# Patient Record
Sex: MALE | Race: ASIAN | NOT HISPANIC OR LATINO | URBAN - METROPOLITAN AREA
[De-identification: names, ages, dates, MRNs, and addresses within clinical notes are randomized per-mention and may not be internally consistent; named-entity substitution may affect disease eponyms.]

---

## 2018-12-05 ENCOUNTER — EMERGENCY (EMERGENCY)
Facility: HOSPITAL | Age: 63
LOS: 1 days | Discharge: HOME | End: 2018-12-05
Attending: EMERGENCY MEDICINE
Payer: COMMERCIAL

## 2018-12-05 VITALS
WEIGHT: 171.08 LBS | HEART RATE: 63 BPM | DIASTOLIC BLOOD PRESSURE: 73 MMHG | TEMPERATURE: 98 F | SYSTOLIC BLOOD PRESSURE: 146 MMHG | OXYGEN SATURATION: 97 % | RESPIRATION RATE: 16 BRPM | HEIGHT: 66 IN

## 2018-12-05 LAB
ALBUMIN SERPL ELPH-MCNC: 4.6 G/DL — SIGNIFICANT CHANGE UP (ref 3.3–5)
ALP SERPL-CCNC: 50 U/L — SIGNIFICANT CHANGE UP (ref 40–120)
ALT FLD-CCNC: 48 U/L — HIGH (ref 10–45)
ANION GAP SERPL CALC-SCNC: 14 MMOL/L — SIGNIFICANT CHANGE UP (ref 5–17)
APPEARANCE UR: ABNORMAL
AST SERPL-CCNC: 30 U/L — SIGNIFICANT CHANGE UP (ref 10–40)
BACTERIA # UR AUTO: ABNORMAL
BASOPHILS # BLD AUTO: 0.2 K/UL — SIGNIFICANT CHANGE UP (ref 0–0.2)
BASOPHILS NFR BLD AUTO: 2.3 % — HIGH (ref 0–2)
BILIRUB SERPL-MCNC: 0.4 MG/DL — SIGNIFICANT CHANGE UP (ref 0.2–1.2)
BILIRUB UR-MCNC: NEGATIVE — SIGNIFICANT CHANGE UP
BUN SERPL-MCNC: 13 MG/DL — SIGNIFICANT CHANGE UP (ref 7–23)
CALCIUM SERPL-MCNC: 9.9 MG/DL — SIGNIFICANT CHANGE UP (ref 8.4–10.5)
CHLORIDE SERPL-SCNC: 102 MMOL/L — SIGNIFICANT CHANGE UP (ref 96–108)
CO2 SERPL-SCNC: 23 MMOL/L — SIGNIFICANT CHANGE UP (ref 22–31)
COLOR SPEC: SIGNIFICANT CHANGE UP
CREAT SERPL-MCNC: 1.16 MG/DL — SIGNIFICANT CHANGE UP (ref 0.5–1.3)
DIFF PNL FLD: ABNORMAL
EOSINOPHIL # BLD AUTO: 0.2 K/UL — SIGNIFICANT CHANGE UP (ref 0–0.5)
EOSINOPHIL NFR BLD AUTO: 2.2 % — SIGNIFICANT CHANGE UP (ref 0–6)
EPI CELLS # UR: 0 /HPF — SIGNIFICANT CHANGE UP
GLUCOSE SERPL-MCNC: 126 MG/DL — HIGH (ref 70–99)
GLUCOSE UR QL: ABNORMAL
HCT VFR BLD CALC: 37.6 % — LOW (ref 39–50)
HGB BLD-MCNC: 12.7 G/DL — LOW (ref 13–17)
HYALINE CASTS # UR AUTO: 0 /LPF — SIGNIFICANT CHANGE UP (ref 0–2)
KETONES UR-MCNC: NEGATIVE — SIGNIFICANT CHANGE UP
LEUKOCYTE ESTERASE UR-ACNC: ABNORMAL
LIDOCAIN IGE QN: 333 U/L — HIGH (ref 7–60)
LYMPHOCYTES # BLD AUTO: 1.7 K/UL — SIGNIFICANT CHANGE UP (ref 1–3.3)
LYMPHOCYTES # BLD AUTO: 17.1 % — SIGNIFICANT CHANGE UP (ref 13–44)
MCHC RBC-ENTMCNC: 29.1 PG — SIGNIFICANT CHANGE UP (ref 27–34)
MCHC RBC-ENTMCNC: 33.9 GM/DL — SIGNIFICANT CHANGE UP (ref 32–36)
MCV RBC AUTO: 85.9 FL — SIGNIFICANT CHANGE UP (ref 80–100)
MONOCYTES # BLD AUTO: 0.7 K/UL — SIGNIFICANT CHANGE UP (ref 0–0.9)
MONOCYTES NFR BLD AUTO: 7 % — SIGNIFICANT CHANGE UP (ref 2–14)
NEUTROPHILS # BLD AUTO: 7.1 K/UL — SIGNIFICANT CHANGE UP (ref 1.8–7.4)
NEUTROPHILS NFR BLD AUTO: 71.5 % — SIGNIFICANT CHANGE UP (ref 43–77)
NITRITE UR-MCNC: NEGATIVE — SIGNIFICANT CHANGE UP
PH UR: 6 — SIGNIFICANT CHANGE UP (ref 5–8)
PLATELET # BLD AUTO: 217 K/UL — SIGNIFICANT CHANGE UP (ref 150–400)
POTASSIUM SERPL-MCNC: 5 MMOL/L — SIGNIFICANT CHANGE UP (ref 3.5–5.3)
POTASSIUM SERPL-SCNC: 5 MMOL/L — SIGNIFICANT CHANGE UP (ref 3.5–5.3)
PROT SERPL-MCNC: 7.5 G/DL — SIGNIFICANT CHANGE UP (ref 6–8.3)
PROT UR-MCNC: ABNORMAL
RBC # BLD: 4.37 M/UL — SIGNIFICANT CHANGE UP (ref 4.2–5.8)
RBC # FLD: 12.7 % — SIGNIFICANT CHANGE UP (ref 10.3–14.5)
RBC CASTS # UR COMP ASSIST: 13 /HPF — HIGH (ref 0–4)
SODIUM SERPL-SCNC: 139 MMOL/L — SIGNIFICANT CHANGE UP (ref 135–145)
SP GR SPEC: 1.01 — SIGNIFICANT CHANGE UP (ref 1.01–1.02)
UROBILINOGEN FLD QL: NEGATIVE — SIGNIFICANT CHANGE UP
WBC # BLD: 10 K/UL — SIGNIFICANT CHANGE UP (ref 3.8–10.5)
WBC # FLD AUTO: 10 K/UL — SIGNIFICANT CHANGE UP (ref 3.8–10.5)
WBC UR QL: 206 /HPF — HIGH (ref 0–5)

## 2018-12-05 PROCEDURE — 74176 CT ABD & PELVIS W/O CONTRAST: CPT | Mod: 26

## 2018-12-05 PROCEDURE — 99218: CPT

## 2018-12-05 RX ORDER — DEXTROSE 50 % IN WATER 50 %
12.5 SYRINGE (ML) INTRAVENOUS ONCE
Qty: 0 | Refills: 0 | Status: DISCONTINUED | OUTPATIENT
Start: 2018-12-05 | End: 2018-12-09

## 2018-12-05 RX ORDER — SODIUM CHLORIDE 9 MG/ML
1000 INJECTION, SOLUTION INTRAVENOUS ONCE
Qty: 0 | Refills: 0 | Status: COMPLETED | OUTPATIENT
Start: 2018-12-05 | End: 2018-12-05

## 2018-12-05 RX ORDER — INSULIN LISPRO 100/ML
VIAL (ML) SUBCUTANEOUS AT BEDTIME
Qty: 0 | Refills: 0 | Status: DISCONTINUED | OUTPATIENT
Start: 2018-12-05 | End: 2018-12-09

## 2018-12-05 RX ORDER — SODIUM CHLORIDE 9 MG/ML
1000 INJECTION, SOLUTION INTRAVENOUS
Qty: 0 | Refills: 0 | Status: DISCONTINUED | OUTPATIENT
Start: 2018-12-05 | End: 2018-12-09

## 2018-12-05 RX ORDER — ATORVASTATIN CALCIUM 80 MG/1
80 TABLET, FILM COATED ORAL AT BEDTIME
Qty: 0 | Refills: 0 | Status: DISCONTINUED | OUTPATIENT
Start: 2018-12-05 | End: 2018-12-09

## 2018-12-05 RX ORDER — IBUPROFEN 200 MG
600 TABLET ORAL ONCE
Qty: 0 | Refills: 0 | Status: DISCONTINUED | OUTPATIENT
Start: 2018-12-05 | End: 2018-12-05

## 2018-12-05 RX ORDER — DEXTROSE 50 % IN WATER 50 %
25 SYRINGE (ML) INTRAVENOUS ONCE
Qty: 0 | Refills: 0 | Status: DISCONTINUED | OUTPATIENT
Start: 2018-12-05 | End: 2018-12-09

## 2018-12-05 RX ORDER — CEFTRIAXONE 500 MG/1
1 INJECTION, POWDER, FOR SOLUTION INTRAMUSCULAR; INTRAVENOUS EVERY 12 HOURS
Qty: 0 | Refills: 0 | Status: DISCONTINUED | OUTPATIENT
Start: 2018-12-05 | End: 2018-12-09

## 2018-12-05 RX ORDER — CEFTRIAXONE 500 MG/1
1 INJECTION, POWDER, FOR SOLUTION INTRAMUSCULAR; INTRAVENOUS ONCE
Qty: 0 | Refills: 0 | Status: COMPLETED | OUTPATIENT
Start: 2018-12-05 | End: 2018-12-05

## 2018-12-05 RX ORDER — ISOSORBIDE MONONITRATE 60 MG/1
60 TABLET, EXTENDED RELEASE ORAL DAILY
Qty: 0 | Refills: 0 | Status: DISCONTINUED | OUTPATIENT
Start: 2018-12-05 | End: 2018-12-09

## 2018-12-05 RX ORDER — LISINOPRIL 2.5 MG/1
20 TABLET ORAL DAILY
Qty: 0 | Refills: 0 | Status: DISCONTINUED | OUTPATIENT
Start: 2018-12-05 | End: 2018-12-09

## 2018-12-05 RX ORDER — CARVEDILOL PHOSPHATE 80 MG/1
6.25 CAPSULE, EXTENDED RELEASE ORAL EVERY 12 HOURS
Qty: 0 | Refills: 0 | Status: DISCONTINUED | OUTPATIENT
Start: 2018-12-05 | End: 2018-12-09

## 2018-12-05 RX ORDER — DEXTROSE 50 % IN WATER 50 %
15 SYRINGE (ML) INTRAVENOUS ONCE
Qty: 0 | Refills: 0 | Status: DISCONTINUED | OUTPATIENT
Start: 2018-12-05 | End: 2018-12-09

## 2018-12-05 RX ORDER — KETOROLAC TROMETHAMINE 30 MG/ML
15 SYRINGE (ML) INJECTION ONCE
Qty: 0 | Refills: 0 | Status: DISCONTINUED | OUTPATIENT
Start: 2018-12-05 | End: 2018-12-05

## 2018-12-05 RX ORDER — ACETAMINOPHEN 500 MG
975 TABLET ORAL ONCE
Qty: 0 | Refills: 0 | Status: COMPLETED | OUTPATIENT
Start: 2018-12-05 | End: 2018-12-05

## 2018-12-05 RX ORDER — GLUCAGON INJECTION, SOLUTION 0.5 MG/.1ML
1 INJECTION, SOLUTION SUBCUTANEOUS ONCE
Qty: 0 | Refills: 0 | Status: DISCONTINUED | OUTPATIENT
Start: 2018-12-05 | End: 2018-12-09

## 2018-12-05 RX ORDER — INSULIN LISPRO 100/ML
VIAL (ML) SUBCUTANEOUS
Qty: 0 | Refills: 0 | Status: DISCONTINUED | OUTPATIENT
Start: 2018-12-05 | End: 2018-12-09

## 2018-12-05 RX ADMIN — Medication 15 MILLIGRAM(S): at 16:44

## 2018-12-05 RX ADMIN — ATORVASTATIN CALCIUM 80 MILLIGRAM(S): 80 TABLET, FILM COATED ORAL at 22:17

## 2018-12-05 RX ADMIN — CEFTRIAXONE 100 GRAM(S): 500 INJECTION, POWDER, FOR SOLUTION INTRAMUSCULAR; INTRAVENOUS at 14:10

## 2018-12-05 RX ADMIN — SODIUM CHLORIDE 150 MILLILITER(S): 9 INJECTION, SOLUTION INTRAVENOUS at 16:44

## 2018-12-05 RX ADMIN — CARVEDILOL PHOSPHATE 6.25 MILLIGRAM(S): 80 CAPSULE, EXTENDED RELEASE ORAL at 22:18

## 2018-12-05 RX ADMIN — Medication 975 MILLIGRAM(S): at 12:19

## 2018-12-05 RX ADMIN — SODIUM CHLORIDE 1000 MILLILITER(S): 9 INJECTION, SOLUTION INTRAVENOUS at 12:18

## 2018-12-05 RX ADMIN — LISINOPRIL 20 MILLIGRAM(S): 2.5 TABLET ORAL at 22:18

## 2018-12-05 NOTE — ED PROVIDER NOTE - PROGRESS NOTE DETAILS
discussion with patient after CT scan results are negative for stone. UA markedly positive, patient has pyelo in the setting of diabetes. Patient more comfortable and afebrile. will send to CDU for observation of fever and symptomatic development, blood cultures sent and ceftriaxone ordered. patient verbalizes agreement with this plan, as he does not want to be admitted at this time. -Padma Thompson PA-C

## 2018-12-05 NOTE — ED ADULT NURSE NOTE - NSIMPLEMENTINTERV_GEN_ALL_ED
Implemented All Universal Safety Interventions:  [place] to call system. Call bell, personal items and telephone within reach. Instruct patient to call for assistance. Room bathroom lighting operational. Non-slip footwear when patient is off stretcher. Physically safe environment: no spills, clutter or unnecessary equipment. Stretcher in lowest position, wheels locked, appropriate side rails in place.

## 2018-12-05 NOTE — ED PROVIDER NOTE - NS ED ROS FT
Constitutional: +subjective fever and chills  Eyes: No visual changes, eye pain or redness  HEENT: No throat pain, ear pain, nasal pain. No nose bleeding.  CV: No chest pain or lower extremity edema  Resp: No SOB no cough  GI: +abdominal pain. No nausea or vomiting. No diarrhea. No constipation.   : +dysuria and hematuria last night, resolved.   MSK: No musculoskeletal pain  Skin: No rash  Neuro: No headache. No numbness or tingling. No weakness.

## 2018-12-05 NOTE — ED CDU PROVIDER DISPOSITION NOTE - CLINICAL COURSE
64 y/o M PMHx insulin dependent  DM2, HTN, HLD, glaucoma presenting with episode of hematuria and mild abdominal pain x 2 days. Patient reports that pain in his abdomen started yesterday, below his umbilicus and in his R side of his back. He states pain comes and goes in severity but is there all the time. He states he got up to urinate last night and had blood in urine and 'thick urine' which he never had before. He states he took a significant amount of water after this happened and urine has been clear since that time. Reported some pain with urination on episode with blood but has not had pain since that time. Denies any history of kidney stones. No frequency, urgency. Reports he felt feverish and chills last night but did not check his temperature.  ED course: Labs unremarkable except lipase 233 and UA + WBC, leuks, blood, CT AP non contrast negative for acute pathology. Given IVF and IV ceftriaxone while in ED. Patient sent to CDU to monitor for fever, IVF, IV abx, and pain control. 64 y/o M PMHx insulin dependent  DM2, HTN, HLD, glaucoma presenting with episode of hematuria and mild abdominal pain x 2 days. Patient reports that pain in his abdomen started yesterday, below his umbilicus and in his R side of his back. He states pain comes and goes in severity but is there all the time. He states he got up to urinate last night and had blood in urine and 'thick urine' which he never had before. He states he took a significant amount of water after this happened and urine has been clear since that time. Reported some pain with urination on episode with blood but has not had pain since that time. Denies any history of kidney stones. No frequency, urgency. Reports he felt feverish and chills last night but did not check his temperature.  ED course: Labs unremarkable except lipase 233 and UA + WBC, leuks, blood, CT AP non contrast negative for acute pathology. Given IVF and IV ceftriaxone while in ED. Patient sent to CDU to monitor for fever, IVF, IV abx, and pain control.  feeling in CDU with improving Lipase level. Afebrile. Will follow up with PMD and strict return precautions advised.

## 2018-12-05 NOTE — ED CDU PROVIDER INITIAL DAY NOTE - PROGRESS NOTE DETAILS
BAKARI Moy: Patient complaining of 7/10 left third toe pain since yesterday with minimal improvement of home motrin 600mg in which he was taking q4 hours. Examined toe. Does not appear to infected, no evidence of trauma, or gout. No erythema and no draining wound. TTP. Will give toradol and reassess BAKARI Moy: Patient seen at bedside in NAD.  VSS.  Patient resting comfortably without complaints. CDU NOTE BAKARI DOE: Pt resting comfortably, feeling well without complaint. denies any abd/flank pain. notes having some pain in his L 4th toe that has been occurring intermittently for several days, denies any injury. states he got toradol here for the pain but it didn't really help. pt states pain is 7/10 and feels like pins. pt never diagnosed with peripheral neuropathy in the past. NAD, VSS. Abd: flat/ND/NT/soft, no CVAT b/l, MS: L foot: FROM ankle and toes, no swelling, no sign of injury, cap refill <2 sec. pt edu on neuropathy, should f/u with PMD outpt to r/o neuropathy considering h/o DM. will continue monitoring.

## 2018-12-05 NOTE — ED CDU PROVIDER DISPOSITION NOTE - ATTENDING CONTRIBUTION TO CARE
Pt with abdominal pain and R flank pain, ct negative, observed with IV antibiotics and serial exams improved now stable for dc home.

## 2018-12-05 NOTE — ED CDU PROVIDER DISPOSITION NOTE - PLAN OF CARE
1. Take ______ as prescribed.  Increase fluids. Take Motrin 600mg every 8 hours with food if needed for pain.   2.  Follow up with your PMD within 48-72 hours.  3. Worsening pain, new fever, chills, nausea, vomiting return to ER 1. Take Cefpodoxime as prescribed.  Increase fluids. Take Tylenol 650mg every 4-6 hours with food if needed for pain. Continue with all your home medications.   2.  Follow up with your PMD within 48-72 hours.  3. Worsening pain, new fever, chills, nausea, vomiting return to ER

## 2018-12-05 NOTE — ED PROVIDER NOTE - PHYSICAL EXAMINATION
GEN: Well Appearing, Nontoxic, NAD  HEENT: NC/AT, Symm Facies. PERRL, EOMI, MMM, posterior pharynx clear  CV: No JVD/Bruits or stridor;  +S1S2, RRR w/o m/g/r  RESP: CTAB w/o w/r/r  ABD: Soft, mild abdominal tenderness suprapubic and mild distention, +BS. No guarding/rebound. No RUQ tender, no CVAT  EXT/MSK: No lower extremity edema or calf tenderness. WWP, palpable pulses. FROMx4  SKIN: No erythema, lesions or rash  Neuro: Grossly intact, AOX3 with normal speech, CN II-XII intact; Sensation intact, motor 5/5 throughout. Gait normal

## 2018-12-05 NOTE — ED CDU PROVIDER INITIAL DAY NOTE - OBJECTIVE STATEMENT
62 y/o M PMHx DM2, HTN, HLD, glaucoma presenting with episode of blood in urine, 'thinking urine was thick' last night, as well as some mild abdominal pain. Patient reports that pain in his abdomen started yesterday, below his umbilicus and in his R side of his back. He states pain comes and goes in severity but is there all the time. He states he got up to urinate last night and had blood in urine and 'thick urine' which he never had before. He states he took a significant amount of water after this happened and urine has been clear since that time. Reported some pain with urination on episode with blood but has not had pain since that time. Denies any history of kidney stones. No frequency, urgency. Reports he felt feverish and chills last night but did not check his temperature.    ED course: Labs unremarkable except lipase 233 and UA + WBC, leuks, blood, CT AP non contrast negative for acute pathology. Given IVF and IV ceftriaxone while in ED. Patient was transferred to monitor for fever, IVF, IV abx, and pain control 64 y/o M PMHx insulin dependent  DM2, HTN, HLD, glaucoma presenting with episode of blood in urine, 'thinking urine was thick' last night, as well as some mild abdominal pain. Patient reports that pain in his abdomen started yesterday, below his umbilicus and in his R side of his back. He states pain comes and goes in severity but is there all the time. He states he got up to urinate last night and had blood in urine and 'thick urine' which he never had before. He states he took a significant amount of water after this happened and urine has been clear since that time. Reported some pain with urination on episode with blood but has not had pain since that time. Denies any history of kidney stones. No frequency, urgency. Reports he felt feverish and chills last night but did not check his temperature.    ED course: Labs unremarkable except lipase 233 and UA + WBC, leuks, blood, CT AP non contrast negative for acute pathology. Given IVF and IV ceftriaxone while in ED. Patient was transferred to monitor for fever, IVF, IV abx, and pain control

## 2018-12-05 NOTE — ED ADULT NURSE NOTE - PMH
Diabetes    Diabetes    Diabetes mellitus  x 20 years, denies N/N/R  Glaucoma    HTN (hypertension)    HTN (Hypertension)    Hypercholesterolemia    Hyperlipidemia    Hyperlipidemia    Hypertension    Retinal Hemorrhage  s/p bilateral laser surgery  S/P cardiac catheterization  ~2007 in [facility] as per pt

## 2018-12-05 NOTE — ED CDU PROVIDER INITIAL DAY NOTE - DETAILS
62 y/o M PMHx DM2, presenting with episode of blood in urine, 'thinking urine was thick' last night, as well as some mild abdominal pain.   *PYELONEPHRITIS  -IV ceftriaxone 1g q12 hours, IVF, and pain control  -continuous monitoring and freq re-evals  -Discussed case with Dr. Toby Caro

## 2018-12-05 NOTE — ED ADULT NURSE NOTE - OBJECTIVE STATEMENT
62 y/o male with pmhx hernia and [place] accompanied by spouse c/o RLQ pain that radiates throughout R flank and across the abdomen diffusely associated with fever, chills and bright red hematuria with clots x last night.  pt denies any n/v/d or constipation at this time.  abdomen is distended and tender to palpation.  pt is awake, alert and resoponsive to all stimuli.  no sob or respiratory distress noted.  pt was w/u and resting in bed comfortably.  will continue to monitor.

## 2018-12-05 NOTE — ED PROVIDER NOTE - MEDICAL DECISION MAKING DETAILS
Attending MD Caro: 63M with hematuria and lower abd/flank pain, suspect ureteral colic. Plan for CT a/p to confirm kidney stones, UA, analgesia

## 2018-12-05 NOTE — ED PROVIDER NOTE - ATTENDING CONTRIBUTION TO CARE
Attending MD Caro:   I personally have seen and examined this patient.  Physician assistant note reviewed and agree on plan of care and except where noted.  See HPI, PE, and MDM for details.

## 2018-12-05 NOTE — ED PROVIDER NOTE - OBJECTIVE STATEMENT
62 y/o M pmhx DM2, presenting with episode of blood in urine, 'thinking urine was thick' last night, as well as some mild abdominal pain. Patient reports that pain in his abdomen started yesterday, below his umbilicus and in his R side of his back. He states pain comes and goes in severity but is there all the time. He states he got up to urinate last night and had blood in urine and 'thick urine' which he never had before. He states he took a significant amount of water after this happened and urine has been clear since that time. Reported some pain with urination on episode with blood but has not had pain since that time. Denies any history of kidney stones. No frequency, urgency. Reports he felt feverish and chills last night but did not check his temperature.

## 2018-12-05 NOTE — ED ADULT NURSE REASSESSMENT NOTE - NS ED NURSE REASSESS COMMENT FT1
15.15 Pt received from LACEY Reis . Pt is monitored for pyelonephritis for fluids & antibiotics .Pt is A&O x 4. Pt denies any chest pain, palpitations, or dizziness as of now. C/O burning micturition & pt C/O Left toe pain  Safety & comfort measures maintained. IV site looks clean & dry No signs of infiltration noted Call bell in reach. Has stable vitals Will continue to monitor. Family at bed side Continue to monitor
Pt received from LACEY Gomez. Pt oriented to CDU & plan of care was discussed. Pt A&O x 4. Pt in CDU for IV abx and IV fluids. Pt c/o pressures in his abdomen after her urinates, and frequency. Pt denies any abdominal pain right now. Family at bedside. Safety & comfort measures maintained. Call bell in reach. Will continue to monitor.

## 2018-12-06 VITALS
OXYGEN SATURATION: 96 % | SYSTOLIC BLOOD PRESSURE: 164 MMHG | TEMPERATURE: 98 F | HEART RATE: 75 BPM | DIASTOLIC BLOOD PRESSURE: 85 MMHG | RESPIRATION RATE: 18 BRPM

## 2018-12-06 LAB — LIDOCAIN IGE QN: 52 U/L — SIGNIFICANT CHANGE UP (ref 7–60)

## 2018-12-06 PROCEDURE — 87186 SC STD MICRODIL/AGAR DIL: CPT

## 2018-12-06 PROCEDURE — 83690 ASSAY OF LIPASE: CPT

## 2018-12-06 PROCEDURE — 99284 EMERGENCY DEPT VISIT MOD MDM: CPT | Mod: 25

## 2018-12-06 PROCEDURE — 85027 COMPLETE CBC AUTOMATED: CPT

## 2018-12-06 PROCEDURE — 96376 TX/PRO/DX INJ SAME DRUG ADON: CPT

## 2018-12-06 PROCEDURE — 81001 URINALYSIS AUTO W/SCOPE: CPT

## 2018-12-06 PROCEDURE — 80053 COMPREHEN METABOLIC PANEL: CPT

## 2018-12-06 PROCEDURE — 96374 THER/PROPH/DIAG INJ IV PUSH: CPT

## 2018-12-06 PROCEDURE — 99217: CPT

## 2018-12-06 PROCEDURE — 96375 TX/PRO/DX INJ NEW DRUG ADDON: CPT

## 2018-12-06 PROCEDURE — 87086 URINE CULTURE/COLONY COUNT: CPT

## 2018-12-06 PROCEDURE — 74176 CT ABD & PELVIS W/O CONTRAST: CPT

## 2018-12-06 PROCEDURE — 87040 BLOOD CULTURE FOR BACTERIA: CPT

## 2018-12-06 PROCEDURE — 82962 GLUCOSE BLOOD TEST: CPT

## 2018-12-06 PROCEDURE — G0378: CPT

## 2018-12-06 RX ORDER — CEFPODOXIME PROXETIL 100 MG
1 TABLET ORAL
Qty: 20 | Refills: 0 | OUTPATIENT
Start: 2018-12-06 | End: 2018-12-15

## 2018-12-06 RX ADMIN — CEFTRIAXONE 100 GRAM(S): 500 INJECTION, POWDER, FOR SOLUTION INTRAMUSCULAR; INTRAVENOUS at 01:52

## 2018-12-06 NOTE — ED CDU PROVIDER SUBSEQUENT DAY NOTE - HISTORY
No interval changes since initial CDU provider note. Pt feels well without complaint. denies abd/flank pain. NAD, VSS. Abd: Flat/ND/NT/soft. will continue IV ceftriaxone and monitoring. Lauren Alfaro

## 2018-12-06 NOTE — ED CDU PROVIDER SUBSEQUENT DAY NOTE - ATTENDING CONTRIBUTION TO CARE
Pt with abdominal pain and R flank pain with some dysuria reported.  Abdominal CT negative.  Pt in observation with IV antibiotics and serial exams.  Pain resolved in observation and tolerating PO.  Pt good outpatient candidate.

## 2018-12-06 NOTE — ED CDU PROVIDER SUBSEQUENT DAY NOTE - PROGRESS NOTE DETAILS
CDU NOTE BAKARI DOE: Pt resting comfortably, feeling well without complaint. NAD, VSS. Abd: flat/ND/NT/soft. will continue IV ceft and monitoring. Pt resting comfortably. NAD. No complaints. VSS. potential discharge.

## 2018-12-06 NOTE — ED CDU PROVIDER SUBSEQUENT DAY NOTE - PMH
Diabetes mellitus  x 20 years, denies N/N/R  Glaucoma    HTN (Hypertension)    Hypercholesterolemia    Hyperlipidemia    Hypertension    Retinal Hemorrhage  s/p bilateral laser surgery  S/P cardiac catheterization  ~2007 in [facility] as per pt

## 2018-12-07 NOTE — ED POST DISCHARGE NOTE - OTHER COMMUNICATION
12/7/18: Prelim ucx results as above. Patient discharged on Cefpodoxime for pyelo. Will await final sensitivities to determine need for contact vs appropriate care given. - Bob Gracia PA-C

## 2018-12-10 LAB
CULTURE RESULTS: SIGNIFICANT CHANGE UP
CULTURE RESULTS: SIGNIFICANT CHANGE UP
SPECIMEN SOURCE: SIGNIFICANT CHANGE UP
SPECIMEN SOURCE: SIGNIFICANT CHANGE UP
